# Patient Record
Sex: FEMALE | Race: BLACK OR AFRICAN AMERICAN | NOT HISPANIC OR LATINO | Employment: STUDENT | URBAN - METROPOLITAN AREA
[De-identification: names, ages, dates, MRNs, and addresses within clinical notes are randomized per-mention and may not be internally consistent; named-entity substitution may affect disease eponyms.]

---
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
## 2023-01-10 ENCOUNTER — OFFICE VISIT (OUTPATIENT)
Dept: FAMILY MEDICINE | Facility: CLINIC | Age: 18
End: 2023-01-10
Payer: COMMERCIAL

## 2023-01-10 VITALS
OXYGEN SATURATION: 98 % | HEART RATE: 77 BPM | TEMPERATURE: 99 F | BODY MASS INDEX: 30.31 KG/M2 | HEIGHT: 64 IN | DIASTOLIC BLOOD PRESSURE: 76 MMHG | SYSTOLIC BLOOD PRESSURE: 114 MMHG | WEIGHT: 177.56 LBS

## 2023-01-10 DIAGNOSIS — Z00.00 ANNUAL PHYSICAL EXAM: Primary | ICD-10-CM

## 2023-01-10 DIAGNOSIS — Z23 NEEDS FLU SHOT: ICD-10-CM

## 2023-01-10 DIAGNOSIS — G43.709 CHRONIC MIGRAINE WITHOUT AURA WITHOUT STATUS MIGRAINOSUS, NOT INTRACTABLE: ICD-10-CM

## 2023-01-10 PROCEDURE — 90471 FLU VACCINE (QUAD) GREATER THAN OR EQUAL TO 3YO PRESERVATIVE FREE IM: ICD-10-PCS | Mod: S$GLB,,, | Performed by: FAMILY MEDICINE

## 2023-01-10 PROCEDURE — 99999 PR PBB SHADOW E&M-EST. PATIENT-LVL IV: ICD-10-PCS | Mod: PBBFAC,,, | Performed by: FAMILY MEDICINE

## 2023-01-10 PROCEDURE — 99999 PR PBB SHADOW E&M-EST. PATIENT-LVL IV: CPT | Mod: PBBFAC,,, | Performed by: FAMILY MEDICINE

## 2023-01-10 PROCEDURE — 90471 IMMUNIZATION ADMIN: CPT | Mod: S$GLB,,, | Performed by: FAMILY MEDICINE

## 2023-01-10 PROCEDURE — 99395 PREV VISIT EST AGE 18-39: CPT | Mod: 25,S$GLB,, | Performed by: FAMILY MEDICINE

## 2023-01-10 PROCEDURE — 90686 FLU VACCINE (QUAD) GREATER THAN OR EQUAL TO 3YO PRESERVATIVE FREE IM: ICD-10-PCS | Mod: S$GLB,,, | Performed by: FAMILY MEDICINE

## 2023-01-10 PROCEDURE — 90686 IIV4 VACC NO PRSV 0.5 ML IM: CPT | Mod: S$GLB,,, | Performed by: FAMILY MEDICINE

## 2023-01-10 PROCEDURE — 99395 PR PREVENTIVE VISIT,EST,18-39: ICD-10-PCS | Mod: 25,S$GLB,, | Performed by: FAMILY MEDICINE

## 2023-01-10 RX ORDER — NORGESTIMATE AND ETHINYL ESTRADIOL 7DAYSX3 28
1 KIT ORAL
COMMUNITY
Start: 2022-09-02 | End: 2023-01-10

## 2023-01-10 RX ORDER — ISOTRETINOIN 40 MG/1
40 CAPSULE, LIQUID FILLED ORAL
COMMUNITY
Start: 2022-09-01 | End: 2023-01-10

## 2023-01-10 RX ORDER — PROPRANOLOL HYDROCHLORIDE 20 MG/1
20 TABLET ORAL DAILY
Qty: 30 TABLET | Refills: 11 | Status: SHIPPED | OUTPATIENT
Start: 2023-01-10 | End: 2023-09-15

## 2023-01-10 RX ORDER — RIZATRIPTAN BENZOATE 10 MG/1
5-10 TABLET ORAL DAILY PRN
Qty: 18 TABLET | Refills: 5 | Status: SHIPPED | OUTPATIENT
Start: 2023-01-10 | End: 2023-09-15

## 2023-01-10 NOTE — PROGRESS NOTES
Assessment & Plan  Annual physical exam  -     Lipid Panel; Future; Expected date: 01/10/2023  -     Hemoglobin A1C; Future; Expected date: 01/10/2023  -     Comprehensive Metabolic Panel; Future; Expected date: 01/10/2023  -     CBC Auto Differential; Future; Expected date: 01/10/2023    Routine fasting labs sent to [facility].    Chronic migraine without aura without status migrainosus, not intractable  -     propranoloL (INDERAL) 20 MG tablet; Take 1 tablet (20 mg total) by mouth once daily.  Dispense: 30 tablet; Refill: 11  -     rizatriptan (MAXALT) 10 MG tablet; Take 0.5-1 tablets (5-10 mg total) by mouth daily as needed for Migraine.  Dispense: 18 tablet; Refill: 5    Start propranolol for migraine prophylaxis. Maxalt 5-10 mg prn for abortive therapy. Advised to take at the onset of HA to be most effective. Informed of most common side effects. F/u in Mychart if no improvement after 1 month of treatment.    Needs flu shot  -     Influenza - Quadrivalent (PF)      Follow-up: Follow up if symptoms worsen or fail to improve.  ______________________________________________________________________    Chief Complaint  Chief Complaint   Patient presents with    Annual Exam       HPI  Stephany Magaña is a 17 y.o. female with medical diagnoses as listed in the medical history and problem list that presents to the office for her annual exam. She c/o headaches that occur 2-3 times a week without a known consistent trigger. Headaches feel like pressure around her head with photophobia. Sometimes they are worse when she wakes up. UTD on eye exam. Taking Motrin prn with limited relief. Drinks 4 bottles of water a day.    Health Maintenance         Date Due Completion Date    Chlamydia Screening Never done ---    COVID-19 Vaccine (4 - Booster for Pfizer series) 03/08/2022 1/11/2022    Influenza Vaccine (1) 09/01/2022 1/10/2022    TETANUS VACCINE 07/28/2024 7/28/2014              PAST MEDICAL HISTORY:  Past Medical History:    Diagnosis Date    Precocious puberty        PAST SURGICAL HISTORY:  Past Surgical History:   Procedure Laterality Date    KNEE SURGERY Left 11/2019    torn ACL       SOCIAL HISTORY:  Social History     Socioeconomic History    Marital status: Single   Tobacco Use    Smoking status: Never    Smokeless tobacco: Never   Substance and Sexual Activity    Alcohol use: Never    Drug use: Never    Sexual activity: Never       FAMILY HISTORY:  History reviewed. No pertinent family history.    ALLERGIES AND MEDICATIONS: updated and reviewed.  Review of patient's allergies indicates:   Allergen Reactions    No known drug allergies      Current Outpatient Medications   Medication Sig Dispense Refill    propranoloL (INDERAL) 20 MG tablet Take 1 tablet (20 mg total) by mouth once daily. 30 tablet 11    rizatriptan (MAXALT) 10 MG tablet Take 0.5-1 tablets (5-10 mg total) by mouth daily as needed for Migraine. 18 tablet 5     No current facility-administered medications for this visit.         ROS  Review of Systems   Constitutional:  Negative for activity change, fever and unexpected weight change.   HENT:  Negative for congestion and sore throat.    Eyes:  Positive for photophobia. Negative for visual disturbance.   Respiratory:  Negative for cough and shortness of breath.    Cardiovascular:  Negative for chest pain and leg swelling.   Gastrointestinal:  Negative for abdominal pain, constipation, diarrhea, nausea and vomiting.   Endocrine: Negative for polydipsia, polyphagia and polyuria.   Genitourinary:  Negative for dysuria and urgency.   Musculoskeletal:  Negative for arthralgias and gait problem.   Skin:  Negative for color change.   Neurological:  Positive for headaches. Negative for dizziness and weakness.   Psychiatric/Behavioral:  Negative for dysphoric mood and sleep disturbance. The patient is not nervous/anxious.          Physical Exam  Vitals:    01/10/23 0853   BP: 114/76   BP Location: Right arm   Patient  "Position: Sitting   BP Method: Medium (Manual)   Pulse: 77   Temp: 98.5 °F (36.9 °C)   TempSrc: Oral   SpO2: 98%   Weight: 80.6 kg (177 lb 9.3 oz)   Height: 5' 4" (1.626 m)    Body mass index is 30.48 kg/m².  Weight: 80.6 kg (177 lb 9.3 oz)   Height: 5' 4" (162.6 cm)   Physical Exam  Constitutional:       General: She is not in acute distress.  HENT:      Head: Normocephalic and atraumatic.      Right Ear: Tympanic membrane and ear canal normal.      Left Ear: Tympanic membrane and ear canal normal.      Nose: Nose normal.      Mouth/Throat:      Mouth: Mucous membranes are moist.      Pharynx: Oropharynx is clear.   Eyes:      Conjunctiva/sclera: Conjunctivae normal.      Pupils: Pupils are equal, round, and reactive to light.   Neck:      Thyroid: No thyromegaly.   Cardiovascular:      Rate and Rhythm: Normal rate and regular rhythm.      Pulses: Normal pulses.      Heart sounds: Normal heart sounds.   Pulmonary:      Effort: Pulmonary effort is normal. No respiratory distress.      Breath sounds: Normal breath sounds.   Musculoskeletal:      Cervical back: Neck supple.      Right lower leg: No edema.      Left lower leg: No edema.   Lymphadenopathy:      Cervical: No cervical adenopathy.   Skin:     General: Skin is warm and dry.      Findings: No rash.   Neurological:      General: No focal deficit present.      Mental Status: She is alert and oriented to person, place, and time.      Cranial Nerves: No cranial nerve deficit.   Psychiatric:         Mood and Affect: Mood normal.         Behavior: Behavior normal.         Thought Content: Thought content normal.           "

## 2023-01-11 LAB
ALBUMIN SERPL-MCNC: 4.5 G/DL (ref 3.6–5.1)
ALBUMIN/GLOB SERPL: 1.4 (CALC) (ref 1–2.5)
ALP SERPL-CCNC: 46 U/L (ref 36–128)
ALT SERPL-CCNC: 9 U/L (ref 5–32)
AST SERPL-CCNC: 13 U/L (ref 12–32)
BASOPHILS # BLD AUTO: 21 CELLS/UL (ref 0–200)
BASOPHILS NFR BLD AUTO: 0.4 %
BILIRUB SERPL-MCNC: 0.7 MG/DL (ref 0.2–1.1)
BUN SERPL-MCNC: 13 MG/DL (ref 7–20)
BUN/CREAT SERPL: NORMAL (CALC) (ref 6–22)
CALCIUM SERPL-MCNC: 9.8 MG/DL (ref 8.9–10.4)
CHLORIDE SERPL-SCNC: 102 MMOL/L (ref 98–110)
CHOLEST SERPL-MCNC: 170 MG/DL
CHOLEST/HDLC SERPL: 3.3 (CALC)
CO2 SERPL-SCNC: 28 MMOL/L (ref 20–32)
CREAT SERPL-MCNC: 0.74 MG/DL (ref 0.5–0.96)
EGFR: 119 ML/MIN/1.73M2
EOSINOPHIL # BLD AUTO: 31 CELLS/UL (ref 15–500)
EOSINOPHIL NFR BLD AUTO: 0.6 %
ERYTHROCYTE [DISTWIDTH] IN BLOOD BY AUTOMATED COUNT: 12 % (ref 11–15)
GLOBULIN SER CALC-MCNC: 3.3 G/DL (CALC) (ref 2–3.8)
GLUCOSE SERPL-MCNC: 85 MG/DL (ref 65–99)
HBA1C MFR BLD: 5 % OF TOTAL HGB
HCT VFR BLD AUTO: 38.3 % (ref 35–45)
HDLC SERPL-MCNC: 51 MG/DL
HGB BLD-MCNC: 12.1 G/DL (ref 11.7–15.5)
LDLC SERPL CALC-MCNC: 101 MG/DL (CALC)
LYMPHOCYTES # BLD AUTO: 1867 CELLS/UL (ref 850–3900)
LYMPHOCYTES NFR BLD AUTO: 35.9 %
MCH RBC QN AUTO: 27.2 PG (ref 27–33)
MCHC RBC AUTO-ENTMCNC: 31.6 G/DL (ref 32–36)
MCV RBC AUTO: 86.1 FL (ref 80–100)
MONOCYTES # BLD AUTO: 296 CELLS/UL (ref 200–950)
MONOCYTES NFR BLD AUTO: 5.7 %
NEUTROPHILS # BLD AUTO: 2985 CELLS/UL (ref 1500–7800)
NEUTROPHILS NFR BLD AUTO: 57.4 %
NONHDLC SERPL-MCNC: 119 MG/DL (CALC)
PLATELET # BLD AUTO: 471 THOUSAND/UL (ref 140–400)
PMV BLD REES-ECKER: 9.1 FL (ref 7.5–12.5)
POTASSIUM SERPL-SCNC: 4.6 MMOL/L (ref 3.8–5.1)
PROT SERPL-MCNC: 7.8 G/DL (ref 6.3–8.2)
RBC # BLD AUTO: 4.45 MILLION/UL (ref 3.8–5.1)
SODIUM SERPL-SCNC: 136 MMOL/L (ref 135–146)
TRIGL SERPL-MCNC: 86 MG/DL
WBC # BLD AUTO: 5.2 THOUSAND/UL (ref 3.8–10.8)

## 2023-01-12 ENCOUNTER — TELEPHONE (OUTPATIENT)
Dept: FAMILY MEDICINE | Facility: CLINIC | Age: 18
End: 2023-01-12
Payer: COMMERCIAL

## 2023-01-12 ENCOUNTER — PATIENT MESSAGE (OUTPATIENT)
Dept: FAMILY MEDICINE | Facility: CLINIC | Age: 18
End: 2023-01-12
Payer: COMMERCIAL

## 2023-01-12 DIAGNOSIS — D75.839 THROMBOCYTOSIS: Primary | ICD-10-CM

## 2023-01-12 NOTE — TELEPHONE ENCOUNTER
Called patient to schedule pathologist interpretation differential blood work order per PCP request. Reviewed patient message thread attached and saw patient was back in [place] for school. Scheduled patient at an [facility] lab in [place] to complete blood work for tomorrow afternoon.

## 2023-01-13 ENCOUNTER — LAB VISIT (OUTPATIENT)
Dept: LAB | Facility: HOSPITAL | Age: 18
End: 2023-01-13
Payer: COMMERCIAL

## 2023-01-13 DIAGNOSIS — D75.839 THROMBOCYTOSIS: ICD-10-CM

## 2023-01-13 LAB
BASOPHILS # BLD AUTO: 19 CELLS/UL (ref 0–200)
BASOPHILS NFR BLD AUTO: 0.4 %
EOSINOPHIL # BLD AUTO: 58 CELLS/UL (ref 15–500)
EOSINOPHIL NFR BLD AUTO: 1.2 %
ERYTHROCYTE [DISTWIDTH] IN BLOOD BY AUTOMATED COUNT: 11.9 % (ref 11–15)
HCT VFR BLD AUTO: 36.7 % (ref 35–45)
HGB BLD-MCNC: 12.1 G/DL (ref 11.7–15.5)
LYMPHOCYTES # BLD AUTO: 1622 CELLS/UL (ref 850–3900)
LYMPHOCYTES NFR BLD AUTO: 33.8 %
MCH RBC QN AUTO: 28 PG (ref 27–33)
MCHC RBC AUTO-ENTMCNC: 33 G/DL (ref 32–36)
MCV RBC AUTO: 85 FL (ref 80–100)
MONOCYTES # BLD AUTO: 370 CELLS/UL (ref 200–950)
MONOCYTES NFR BLD AUTO: 7.7 %
NEUTROPHILS # BLD AUTO: 2731 CELLS/UL (ref 1500–7800)
NEUTROPHILS NFR BLD AUTO: 56.9 %
PATH REV BLD -IMP: NORMAL
PLATELET # BLD AUTO: 489 THOUSAND/UL (ref 140–400)
PMV BLD REES-ECKER: 9.6 FL (ref 7.5–12.5)
RBC # BLD AUTO: 4.32 MILLION/UL (ref 3.8–5.1)
WBC # BLD AUTO: 4.8 THOUSAND/UL (ref 3.8–10.8)

## 2023-01-13 PROCEDURE — 85060 BLOOD SMEAR INTERPRETATION: CPT | Mod: ,,, | Performed by: PATHOLOGY

## 2023-01-13 PROCEDURE — 36415 COLL VENOUS BLD VENIPUNCTURE: CPT | Mod: PO | Performed by: FAMILY MEDICINE

## 2023-01-13 PROCEDURE — 85025 COMPLETE CBC W/AUTO DIFF WBC: CPT | Performed by: FAMILY MEDICINE

## 2023-01-13 PROCEDURE — 85060 PATHOLOGIST REVIEW: ICD-10-PCS | Mod: ,,, | Performed by: PATHOLOGY

## 2023-01-14 LAB
BASOPHILS # BLD AUTO: 0.03 K/UL (ref 0–0.2)
BASOPHILS NFR BLD: 0.4 % (ref 0–1.9)
DIFFERENTIAL METHOD: ABNORMAL
EOSINOPHIL # BLD AUTO: 0.1 K/UL (ref 0–0.5)
EOSINOPHIL NFR BLD: 1 % (ref 0–8)
ERYTHROCYTE [DISTWIDTH] IN BLOOD BY AUTOMATED COUNT: 12.4 % (ref 11.5–14.5)
HCT VFR BLD AUTO: 39.7 % (ref 37–48.5)
HGB BLD-MCNC: 12.2 G/DL (ref 12–16)
IMM GRANULOCYTES # BLD AUTO: 0.02 K/UL (ref 0–0.04)
IMM GRANULOCYTES NFR BLD AUTO: 0.3 % (ref 0–0.5)
LYMPHOCYTES # BLD AUTO: 2.1 K/UL (ref 1–4.8)
LYMPHOCYTES NFR BLD: 28.6 % (ref 18–48)
MCH RBC QN AUTO: 27.3 PG (ref 27–31)
MCHC RBC AUTO-ENTMCNC: 30.7 G/DL (ref 32–36)
MCV RBC AUTO: 89 FL (ref 82–98)
MONOCYTES # BLD AUTO: 0.5 K/UL (ref 0.3–1)
MONOCYTES NFR BLD: 6.4 % (ref 4–15)
NEUTROPHILS # BLD AUTO: 4.7 K/UL (ref 1.8–7.7)
NEUTROPHILS NFR BLD: 63.3 % (ref 38–73)
NRBC BLD-RTO: 0 /100 WBC
PLATELET # BLD AUTO: 512 K/UL (ref 150–450)
PMV BLD AUTO: 9.7 FL (ref 9.2–12.9)
RBC # BLD AUTO: 4.47 M/UL (ref 4–5.4)
WBC # BLD AUTO: 7.35 K/UL (ref 3.9–12.7)

## 2023-01-17 ENCOUNTER — PATIENT MESSAGE (OUTPATIENT)
Dept: FAMILY MEDICINE | Facility: CLINIC | Age: 18
End: 2023-01-17

## 2023-01-17 ENCOUNTER — PATIENT MESSAGE (OUTPATIENT)
Dept: FAMILY MEDICINE | Facility: CLINIC | Age: 18
End: 2023-01-17
Payer: COMMERCIAL

## 2023-01-17 ENCOUNTER — TELEPHONE (OUTPATIENT)
Dept: FAMILY MEDICINE | Facility: CLINIC | Age: 18
End: 2023-01-17

## 2023-01-17 DIAGNOSIS — D75.839 THROMBOCYTOSIS: Primary | ICD-10-CM

## 2023-01-17 LAB — PATH REV BLD -IMP: NORMAL

## 2023-01-17 PROCEDURE — 99452 NTRPROF PH1/NTRNET/EHR RFRL: CPT | Mod: S$GLB,,, | Performed by: FAMILY MEDICINE

## 2023-01-17 PROCEDURE — 99452 PR INTERPROF, PHONE/INTERNET/EHR, 30 MIN: ICD-10-PCS | Mod: S$GLB,,, | Performed by: FAMILY MEDICINE

## 2023-01-17 NOTE — TELEPHONE ENCOUNTER
[facility]  Request for E-Consult     Patient Name: Stephany Magaña  MRN: [id number]  Primary Care Provider: Brittany Abarca DO     Reason for Request: thrombocytosis    Verbal consent has been obtained from patient, and patient is aware of applicable cost: Yes    Total time spent preparing for the referral and/or communicating with the consulting physician: Time; 16 MIN - 1 HOUR: 16 minutes    Thank you for your time. I will follow-up with patient. Please contact us if you have any additional questions.    Brittany Abarca DO  [facility]

## 2023-01-17 NOTE — TELEPHONE ENCOUNTER
----- Message from Lorena Sorto sent at 1/17/2023  1:52 PM CST -----  Type: Patient Call Back        Who called: Self         What is the request in detail: Pt states she would like for a nurse to give her a call about her blood work results ...         Can the clinic reply by [facility]? No         Would the patient rather a call back or a response via [facility]? Yes         Best call back number: [phone] (home)               Thank You

## 2023-01-18 ENCOUNTER — E-CONSULT (OUTPATIENT)
Dept: HEMATOLOGY/ONCOLOGY | Facility: CLINIC | Age: 18
End: 2023-01-18
Payer: COMMERCIAL

## 2023-01-18 DIAGNOSIS — D75.839 THROMBOCYTOSIS: Primary | ICD-10-CM

## 2023-01-18 NOTE — PROGRESS NOTES
[facility]  [facility]  Response for E-Consult     Patient Name: Stephany Magaña  MRN: [id number]  Primary Care Provider: Brittany Abarca DO   Requesting Provider: Brittany Abarca,       Findings:  The most common cause of thrombocytosis is iron deficiency she could be iron deficient with out having anemia.  Her hemoglobin is at the low end of normal couple of options easiest way to do this is to get her iron studies serum iron TIBC and ferritin if that is not possible you could try therapeutic trial of oral iron or supplement iron rich foods as an excellent article in up-to-date on this and see whether not she response.  I would recommend the iron documentation.  Most likely a 17-year-old female who is menstruating    I did not speak to the requesting provider verbally about this.     Total time of Consultation: 15 minute    Percentage of time spent on verbal/written discussion: 50%     *This eConsult is based on the clinical data available to me and is furnished without benefit of a physical examination. The eConsult will need to be interpreted in light of any clinical issues or changes in patient status not available to me at the time of filing this eConsults. Significant changes in patient condition or level of acuity should result in immediate formal consultation and reevaluation. Please alert me if you have further questions.    Thank you for your consult.     Chris Gaytan MD  [facility]  [facility]

## 2023-01-19 ENCOUNTER — TELEPHONE (OUTPATIENT)
Dept: FAMILY MEDICINE | Facility: CLINIC | Age: 18
End: 2023-01-19
Payer: COMMERCIAL

## 2023-01-19 DIAGNOSIS — D75.839 THROMBOCYTOSIS: Primary | ICD-10-CM

## 2023-01-19 NOTE — TELEPHONE ENCOUNTER
----- Message from Ledy Hall sent at 1/19/2023  9:20 AM CST -----  Regarding: Patient call back  .Type: Patient Call Back    Who called: Self    What is the request in detail: Patient would like lab orders sent over to [facility] in [place]    Can the clinic reply by [facility]? No    Would the patient rather a call back or a response via [facility]? Call     Best call back number: .[phone]      Additional Information: [address] [place] [phone]

## 2023-01-23 ENCOUNTER — PATIENT MESSAGE (OUTPATIENT)
Dept: FAMILY MEDICINE | Facility: CLINIC | Age: 18
End: 2023-01-23
Payer: COMMERCIAL

## 2023-01-23 DIAGNOSIS — D75.839 THROMBOCYTOSIS: Primary | ICD-10-CM

## 2023-01-23 LAB
CRP SERPL-MCNC: 2.4 MG/L
ERYTHROCYTE [SEDIMENTATION RATE] IN BLOOD BY WESTERGREN METHOD: 9 MM/H
FERRITIN SERPL-MCNC: 68 NG/ML (ref 16–154)
IRON SATN MFR SERPL: 41 % (CALC) (ref 15–45)
IRON SERPL-MCNC: 107 MCG/DL (ref 27–164)
TIBC SERPL-MCNC: 261 MCG/DL (CALC) (ref 271–448)

## 2023-02-22 ENCOUNTER — TELEPHONE (OUTPATIENT)
Dept: HEMATOLOGY/ONCOLOGY | Facility: CLINIC | Age: 18
End: 2023-02-22
Payer: COMMERCIAL

## 2023-02-22 NOTE — TELEPHONE ENCOUNTER
To Kamryn anna MA   The patient that needs the lab orders is [id number] natalie Magaña  She needs the following labs at [facility]    CBC w/diff   pathologist review      iron   ferritin     TIBC      soluble transferrin       retic count      sedimentation rate       Folate     MMA -Methylmalonic  acid,serum         Jatin 2                  CALR mutation analysis  Merlin reeves

## 2023-02-24 ENCOUNTER — OFFICE VISIT (OUTPATIENT)
Dept: HEMATOLOGY/ONCOLOGY | Facility: CLINIC | Age: 18
End: 2023-02-24
Payer: COMMERCIAL

## 2023-02-24 VITALS
WEIGHT: 179.25 LBS | DIASTOLIC BLOOD PRESSURE: 68 MMHG | OXYGEN SATURATION: 100 % | HEART RATE: 77 BPM | HEIGHT: 64 IN | BODY MASS INDEX: 30.6 KG/M2 | SYSTOLIC BLOOD PRESSURE: 113 MMHG

## 2023-02-24 DIAGNOSIS — Z76.0 MEDICATION REFILL: ICD-10-CM

## 2023-02-24 DIAGNOSIS — D75.839 THROMBOCYTOSIS: Primary | ICD-10-CM

## 2023-02-24 DIAGNOSIS — Z76.89 ENCOUNTER TO ESTABLISH CARE: ICD-10-CM

## 2023-02-24 PROCEDURE — 99204 PR OFFICE/OUTPT VISIT, NEW, LEVL IV, 45-59 MIN: ICD-10-PCS | Mod: S$GLB,,, | Performed by: STUDENT IN AN ORGANIZED HEALTH CARE EDUCATION/TRAINING PROGRAM

## 2023-02-24 PROCEDURE — 99999 PR PBB SHADOW E&M-EST. PATIENT-LVL III: ICD-10-PCS | Mod: PBBFAC,,, | Performed by: STUDENT IN AN ORGANIZED HEALTH CARE EDUCATION/TRAINING PROGRAM

## 2023-02-24 PROCEDURE — 99204 OFFICE O/P NEW MOD 45 MIN: CPT | Mod: S$GLB,,, | Performed by: STUDENT IN AN ORGANIZED HEALTH CARE EDUCATION/TRAINING PROGRAM

## 2023-02-24 PROCEDURE — 99999 PR PBB SHADOW E&M-EST. PATIENT-LVL III: CPT | Mod: PBBFAC,,, | Performed by: STUDENT IN AN ORGANIZED HEALTH CARE EDUCATION/TRAINING PROGRAM

## 2023-02-24 RX ORDER — FERROUS SULFATE 325(65) MG
325 TABLET, DELAYED RELEASE (ENTERIC COATED) ORAL EVERY OTHER DAY
Qty: 45 TABLET | Refills: 0 | Status: SHIPPED | OUTPATIENT
Start: 2023-02-24 | End: 2023-05-25

## 2023-02-24 NOTE — PROGRESS NOTES
Hematology- Oncology Clinic Note :     2023    No chief complaint on file.        TARI Magaña is a  17 y.o. female with medical diagnoses as listed below in the medical history who presents for thromobocytosis.  Pt presents with mother and feels well and denied any issues at time of exam.  Of note pt completed 6 months of accutane for acne.  Pt has regular periods that are not heavy per pt.  Ferritin low end of normal so trial of PO iron started.  Plts appears to have been fluctuating and cause is most likely multifactorial and we can recheck in 3 months after PO iron trial started.      Active Problem List with Overview Notes    Diagnosis Date Noted    Thrombocytosis 2023    Precocious puberty 2012       Patient Active Problem List    Diagnosis Date Noted    Thrombocytosis 2023    Precocious puberty 2012     Past Medical History:   Diagnosis Date    Precocious puberty       Past Surgical History:   Procedure Laterality Date    KNEE SURGERY Left 2019    torn ACL      (Not in a hospital admission)    Review of patient's allergies indicates:   Allergen Reactions    No known drug allergies       Social History     Tobacco Use    Smoking status: Never    Smokeless tobacco: Never   Substance Use Topics    Alcohol use: Never      No family history on file.     Review of Systems :  Review of Systems   Constitutional:  Negative for fever, malaise/fatigue and weight loss.   HENT:  Negative for congestion and hearing loss.    Eyes:  Negative for blurred vision and photophobia.   Respiratory:  Negative for cough and shortness of breath.    Cardiovascular:  Negative for chest pain.   Gastrointestinal:  Negative for abdominal pain, blood in stool, heartburn, melena, nausea and vomiting.   Genitourinary:  Negative for dysuria and hematuria.   Musculoskeletal:  Negative for joint pain and myalgias.   Skin:  Negative for itching and rash.   Neurological:  Negative for dizziness and  weakness.   Endo/Heme/Allergies:  Does not bruise/bleed easily.   Psychiatric/Behavioral:  Negative for depression.      Physical Exam :  Wt Readings from Last 3 Encounters:   23 81.3 kg (179 lb 3.7 oz) (94 %, Z= 1.59)*   01/10/23 80.6 kg (177 lb 9.3 oz) (94 %, Z= 1.56)*   01/10/22 81.1 kg (178 lb 14.5 oz) (95 %, Z= 1.64)*     * Growth percentiles are based on CDC (Girls, 2-20 Years) data.     Temp Readings from Last 3 Encounters:   01/10/23 98.5 °F (36.9 °C) (Oral)   01/10/22 98.5 °F (36.9 °C) (Oral)   20 98.2 °F (36.8 °C) (Oral)     BP Readings from Last 3 Encounters:   23 113/68   01/10/23 114/76   01/10/22 96/70     Pulse Readings from Last 3 Encounters:   23 77   01/10/23 77   01/10/22 76     Body mass index is 30.77 kg/m².    Physical Exam  Vitals reviewed.   HENT:      Head: Normocephalic and atraumatic.      Mouth/Throat:      Mouth: Mucous membranes are moist.   Eyes:      Pupils: Pupils are equal, round, and reactive to light.   Cardiovascular:      Rate and Rhythm: Normal rate.      Heart sounds: Normal heart sounds.   Pulmonary:      Effort: Pulmonary effort is normal.      Breath sounds: Normal breath sounds.   Abdominal:      General: Abdomen is flat.   Musculoskeletal:         General: Normal range of motion.      Cervical back: Normal range of motion and neck supple.   Skin:     General: Skin is warm and dry.   Neurological:      Mental Status: She is alert and oriented to person, place, and time.   Psychiatric:         Mood and Affect: Mood normal.         Behavior: Behavior normal.         Thought Content: Thought content normal.         Judgment: Judgment normal.         Pertinent Diagnostic studies:    No results found for this or any previous visit (from the past 24 hour(s)).    Assessment/Plan :       Thrombocytosis  -, denies heavy periods or easy bruising or bleeding  -fluctuating for past year  -481 on recent labs at [facility]  -CBC review significant only for mild  thrombocytosis   -Abnormal plt count most likely multifactorial from recent Accutane use, periods  -Ferritin low end of normal so will try oral iron ever other day  -RTC in 3 months for MD and labs and repeat labs    Time spent with pt: 45 minutes       Summary of orders placed this encounter:  Orders Placed This Encounter   Procedures    Soluble Transferrin Receptor    MPN (JAK2 V617F)WITH REFLEX TO CALR,MPL    JAK2 Exon 12 And Other Non-V617 Mutation Detection, Bld    BCR/ABL, RNA-QUAL, DIAGNOSTIC w/REFLEX, BLOOD    METHYLMALONIC ACID, SERUM    FOLATE    COPPER, SERUM    IRON AND TIBC    Ferritin    Soluble Transferrin Receptor    CBC w/ DIFF       Future Appointments   Date Time Provider Department Center   5/15/2023 10:00 AM LAB, [facility] LAB [facility] Hos   5/24/2023 11:00 AM Raul Melara MD [facility] HEM ONC [facility] Cli           Raul Melara MD   Hematology/oncology, [facility]

## 2023-03-21 ENCOUNTER — PATIENT MESSAGE (OUTPATIENT)
Dept: PEDIATRICS | Facility: CLINIC | Age: 18
End: 2023-03-21

## 2023-06-07 ENCOUNTER — PATIENT MESSAGE (OUTPATIENT)
Dept: FAMILY MEDICINE | Facility: CLINIC | Age: 18
End: 2023-06-07
Payer: COMMERCIAL

## 2023-09-15 ENCOUNTER — OFFICE VISIT (OUTPATIENT)
Dept: FAMILY MEDICINE | Facility: CLINIC | Age: 18
End: 2023-09-15
Payer: COMMERCIAL

## 2023-09-15 VITALS
OXYGEN SATURATION: 98 % | HEART RATE: 67 BPM | RESPIRATION RATE: 14 BRPM | HEIGHT: 64 IN | TEMPERATURE: 98 F | DIASTOLIC BLOOD PRESSURE: 64 MMHG | WEIGHT: 173 LBS | SYSTOLIC BLOOD PRESSURE: 115 MMHG | BODY MASS INDEX: 29.53 KG/M2

## 2023-09-15 DIAGNOSIS — R41.840 DIFFICULTY CONCENTRATING: ICD-10-CM

## 2023-09-15 DIAGNOSIS — Z11.3 SCREENING FOR STD (SEXUALLY TRANSMITTED DISEASE): Primary | ICD-10-CM

## 2023-09-15 DIAGNOSIS — G43.709 CHRONIC MIGRAINE WITHOUT AURA WITHOUT STATUS MIGRAINOSUS, NOT INTRACTABLE: ICD-10-CM

## 2023-09-15 DIAGNOSIS — Z00.00 ANNUAL PHYSICAL EXAM: ICD-10-CM

## 2023-09-15 DIAGNOSIS — D75.839 THROMBOCYTOSIS: ICD-10-CM

## 2023-09-15 DIAGNOSIS — Z02.82 ADOPTED PERSON: ICD-10-CM

## 2023-09-15 PROCEDURE — 99999 PR PBB SHADOW E&M-EST. PATIENT-LVL IV: CPT | Mod: PBBFAC,,, | Performed by: STUDENT IN AN ORGANIZED HEALTH CARE EDUCATION/TRAINING PROGRAM

## 2023-09-15 PROCEDURE — 99395 PREV VISIT EST AGE 18-39: CPT | Mod: S$GLB,,, | Performed by: STUDENT IN AN ORGANIZED HEALTH CARE EDUCATION/TRAINING PROGRAM

## 2023-09-15 PROCEDURE — 99999 PR PBB SHADOW E&M-EST. PATIENT-LVL IV: ICD-10-PCS | Mod: PBBFAC,,, | Performed by: STUDENT IN AN ORGANIZED HEALTH CARE EDUCATION/TRAINING PROGRAM

## 2023-09-15 PROCEDURE — 87591 N.GONORRHOEAE DNA AMP PROB: CPT | Performed by: STUDENT IN AN ORGANIZED HEALTH CARE EDUCATION/TRAINING PROGRAM

## 2023-09-15 PROCEDURE — 99395 PR PREVENTIVE VISIT,EST,18-39: ICD-10-PCS | Mod: S$GLB,,, | Performed by: STUDENT IN AN ORGANIZED HEALTH CARE EDUCATION/TRAINING PROGRAM

## 2023-09-15 RX ORDER — PROPRANOLOL HYDROCHLORIDE 20 MG/1
20 TABLET ORAL 2 TIMES DAILY
Qty: 180 TABLET | Refills: 3 | Status: SHIPPED | OUTPATIENT
Start: 2023-09-15 | End: 2023-11-10 | Stop reason: SDUPTHER

## 2023-09-15 NOTE — PROGRESS NOTES
Problem List Items Addressed This Visit          Neuro    Chronic migraine without aura without status migrainosus, not intractable    Overview     Chronic hx  - avoid triggers and use OTC meds sparingly, increase fluid intake, ensure up to date eye exam   - increase to propranolol 20mg BID and prn otc tylenol/excedrine         Relevant Medications    propranoloL (INDERAL) 20 MG tablet       Oncology    Thrombocytosis    Overview     Lab Results   Component Value Date    WBC 7.35 01/13/2023    HGB 12.2 01/13/2023    HCT 39.7 01/13/2023    MCV 89 01/13/2023     (H) 01/13/2023   - repeat labs     path review 2023  PATHOLOGIST INTERPRETATION   RBC- No significant morphologic abnormalities, normal indices   WBC- Normal morphology and differential.  No dysplasia or circulating   blasts.   PLT- Increased in number. No clumping.            Relevant Orders    TSH    Lipid Panel    Hemoglobin A1C    Comprehensive Metabolic Panel    CBC Auto Differential    Sedimentation rate    C-REACTIVE PROTEIN       Other    Annual physical exam    Overview     Complete history and physical was completed today.    Complete and thorough medication reconciliation was performed. Discussed risks and benefits of medications.    Advised patient on orders and health maintenance.    Continue current medications listed on your summary sheet.    All questions were answered.   Follow up as planned or prn            Relevant Orders    TSH    Lipid Panel    Hemoglobin A1C    Comprehensive Metabolic Panel    CBC Auto Differential    Sedimentation rate    C-REACTIVE PROTEIN    Adopted person (Chronic)    Overview     Does not know much about fhx re: add, learning disabilities           Other Visit Diagnoses       Screening for STD (sexually transmitted disease)    -  Primary    Relevant Orders    C. trachomatis/N. gonorrhoeae by AMP DNA    Difficulty concentrating        Relevant Orders    Ambulatory referral/consult to Psychology               Patient ID: Stephany Magaña is a 18 y.o. female.    Chief Complaint:  establish care    Patient is here to establish care. Has a hx of  has a past medical history of Precocious puberty.   Currently a marco at [employer], studying kinesiology.     Works at campus security. Feels safe on campus.     Reports migraines started in HS. Each episode occurs 1-2X weekly. Improving with propranolol 20 daily; however, still withj 4-8 migraines monthly.   Last eye exam: 2023    Drinks adequate water.       History:  OBGYN: n/a      LMP: No LMP recorded.   MMG: no fhx breast cancer    PAP: n/a  Colonoscopy: No personal history of colon cancer, hematochezia, melena, crohn's, ulcerative colitis; No family history of colon cancer.      Health Maintenance Topics with due status: Not Due       Topic Last Completion Date    TETANUS VACCINE 07/28/2014        ==============================================  History reviewed.   Health Maintenance Due   Topic Date Due    Chlamydia Screening  Never done    COVID-19 Vaccine (4 - Pfizer risk series) 03/08/2022    Influenza Vaccine (1) 09/01/2023       Past Medical History:  Past Medical History:   Diagnosis Date    Precocious puberty      Past Surgical History:   Procedure Laterality Date    KNEE SURGERY Left 11/2019    torn ACL     Review of patient's allergies indicates:   Allergen Reactions    No known drug allergies      Current Outpatient Medications on File Prior to Visit   Medication Sig Dispense Refill    [DISCONTINUED] propranoloL (INDERAL) 20 MG tablet Take 1 tablet (20 mg total) by mouth once daily. 30 tablet 11    [DISCONTINUED] rizatriptan (MAXALT) 10 MG tablet Take 0.5-1 tablets (5-10 mg total) by mouth daily as needed for Migraine. (Patient not taking: Reported on 9/15/2023) 18 tablet 5     No current facility-administered medications on file prior to visit.     Social History     Socioeconomic History    Marital status: Single   Tobacco Use    Smoking  status: Never    Smokeless tobacco: Never   Substance and Sexual Activity    Alcohol use: Never    Drug use: Never    Sexual activity: Never     History reviewed. No pertinent family history.       Review of Systems   12 point review of systems per hpi, otherwise negative         Objective:    Nursing note and vitals reviewed.  Vitals:    09/15/23 1358   BP: 115/64   Pulse: 67   Resp: 14   Temp: 98.3 °F (36.8 °C)     Body mass index is 29.7 kg/m².     Physical Exam   Constitutional: SHE is oriented to person, place, and time. She appears well-developed and well-nourished. No distress.   HENT: WNL  Head: Normocephalic and atraumatic.   Eyes: Pupils are equal, round, and reactive to light. EOM are normal.   Neck: Normal range of motion. Neck supple.   Cardiovascular: Normal rate, regular rhythm, normal heart sounds and intact distal pulses.   No murmur heard.  Pulmonary/Chest: Effort normal and breath sounds normal. No respiratory distress. She has no wheezes.   GI: soft, non distended, no ttp, no rebound/guarding  Musculoskeletal: Normal range of motion. She exhibits no edema.   Neurological: She is alert and oriented to person, place, and time. No cranial nerve deficit.   Skin: Skin is warm and dry. Capillary refill takes less than 2 seconds.   Psychiatric: She has a normal mood and affect. Her behavior is normal.           Kamryn Arce MD    We Offer Telehealth & Same Day Appointments!   Book your Telehealth appointment with me through my nurse or   Clinic appointments on [facility]!  [phone]     To Schedule appointments online, go to [facility]: [email or web address]

## 2023-09-17 LAB
C TRACH DNA SPEC QL NAA+PROBE: NOT DETECTED
N GONORRHOEA DNA SPEC QL NAA+PROBE: NOT DETECTED

## 2023-09-29 ENCOUNTER — TELEPHONE (OUTPATIENT)
Dept: FAMILY MEDICINE | Facility: CLINIC | Age: 18
End: 2023-09-29
Payer: COMMERCIAL

## 2023-09-29 NOTE — TELEPHONE ENCOUNTER
----- Message from Soo Ahn sent at 9/29/2023  2:16 PM CDT -----  Contact: COLBY GUZMAN [[id number]]  ..Type:  Patient Requesting Call    Who Called: Chayito, Mother   Does the patient know what this is regarding?: Fax orders to [facility] for blood work  Would the patient rather a call back or a response via [facility]?  call  Best Call Back Number: .[phone]  Additional Information: Fax [phone]    Pt currently at [facility].

## 2023-09-29 NOTE — TELEPHONE ENCOUNTER
Orders changed to [facility]. I have attempted to contact this patient by phone with the following results: no answer, left message to return my call on answering machine.

## 2023-10-02 LAB
ALBUMIN SERPL-MCNC: 4.2 G/DL (ref 3.6–5.1)
ALBUMIN/GLOB SERPL: 1.2 (CALC) (ref 1–2.5)
ALP SERPL-CCNC: 45 U/L (ref 31–125)
ALT SERPL-CCNC: 7 U/L (ref 6–29)
AST SERPL-CCNC: 11 U/L (ref 10–30)
BASOPHILS # BLD AUTO: 28 CELLS/UL (ref 0–200)
BASOPHILS NFR BLD AUTO: 0.4 %
BILIRUB SERPL-MCNC: 0.9 MG/DL (ref 0.2–1.2)
BUN SERPL-MCNC: 12 MG/DL (ref 7–25)
BUN/CREAT SERPL: ABNORMAL (CALC) (ref 6–22)
CALCIUM SERPL-MCNC: 9.8 MG/DL (ref 8.6–10.2)
CHLORIDE SERPL-SCNC: 101 MMOL/L (ref 98–110)
CHOLEST SERPL-MCNC: 159 MG/DL
CHOLEST/HDLC SERPL: 3 (CALC)
CO2 SERPL-SCNC: 25 MMOL/L (ref 20–32)
CREAT SERPL-MCNC: 0.76 MG/DL (ref 0.5–0.96)
CRP SERPL-MCNC: 2.9 MG/L
EGFR: 115 ML/MIN/1.73M2
EOSINOPHIL # BLD AUTO: 71 CELLS/UL (ref 15–500)
EOSINOPHIL NFR BLD AUTO: 1 %
ERYTHROCYTE [DISTWIDTH] IN BLOOD BY AUTOMATED COUNT: 11.7 % (ref 11–15)
ERYTHROCYTE [SEDIMENTATION RATE] IN BLOOD BY WESTERGREN METHOD: 2 MM/H
GLOBULIN SER CALC-MCNC: 3.4 G/DL (CALC) (ref 1.9–3.7)
GLUCOSE SERPL-MCNC: 81 MG/DL (ref 65–99)
HBA1C MFR BLD: 5 % OF TOTAL HGB
HCT VFR BLD AUTO: 38.4 % (ref 35–45)
HDLC SERPL-MCNC: 53 MG/DL
HGB BLD-MCNC: 12.1 G/DL (ref 11.7–15.5)
LDLC SERPL CALC-MCNC: 93 MG/DL (CALC)
LYMPHOCYTES # BLD AUTO: 2407 CELLS/UL (ref 850–3900)
LYMPHOCYTES NFR BLD AUTO: 33.9 %
MCH RBC QN AUTO: 27.3 PG (ref 27–33)
MCHC RBC AUTO-ENTMCNC: 31.5 G/DL (ref 32–36)
MCV RBC AUTO: 86.5 FL (ref 80–100)
MONOCYTES # BLD AUTO: 419 CELLS/UL (ref 200–950)
MONOCYTES NFR BLD AUTO: 5.9 %
NEUTROPHILS # BLD AUTO: 4175 CELLS/UL (ref 1500–7800)
NEUTROPHILS NFR BLD AUTO: 58.8 %
NONHDLC SERPL-MCNC: 106 MG/DL (CALC)
PLATELET # BLD AUTO: 459 THOUSAND/UL (ref 140–400)
PMV BLD REES-ECKER: 9.3 FL (ref 7.5–12.5)
POTASSIUM SERPL-SCNC: 4.8 MMOL/L (ref 3.5–5.3)
PROT SERPL-MCNC: 7.6 G/DL (ref 6.1–8.1)
RBC # BLD AUTO: 4.44 MILLION/UL (ref 3.8–5.1)
SODIUM SERPL-SCNC: 134 MMOL/L (ref 135–146)
TRIGL SERPL-MCNC: 52 MG/DL
TSH SERPL-ACNC: 1.37 MIU/L
WBC # BLD AUTO: 7.1 THOUSAND/UL (ref 3.8–10.8)

## 2023-10-03 NOTE — PROGRESS NOTES
6m cbc          Dear Ms.Stephany Magaña       I have reviewed your recent blood work:       - Your complete blood count is normal, slightly elevated platelets, but improving. Repeat in 6m    - Your metabolic panel which shows your electrolytes, glucose, kidney function, and liver function is normal      - A1c is normal (labs to screen for prediabetes and diabetes)     - Thyroid function is normal     Your cholesterol is normal     Please do not hesitate to call or message with any additional questions or concerns.  Kamryn Arce MD

## 2023-11-10 DIAGNOSIS — G43.709 CHRONIC MIGRAINE WITHOUT AURA WITHOUT STATUS MIGRAINOSUS, NOT INTRACTABLE: ICD-10-CM

## 2023-11-10 NOTE — TELEPHONE ENCOUNTER
No care due was identified.  [facility] Embedded Care Due Messages. Reference number: [id number].   11/10/2023 3:13:47 PM CST

## 2023-11-11 RX ORDER — PROPRANOLOL HYDROCHLORIDE 20 MG/1
20 TABLET ORAL 2 TIMES DAILY
Qty: 180 TABLET | Refills: 3 | Status: SHIPPED | OUTPATIENT
Start: 2023-11-11

## 2023-11-11 NOTE — TELEPHONE ENCOUNTER
Refill Routing Note   Medication(s) are not appropriate for processing by [facility] for the following reason(s):      New or recently adjusted medication    [facility] action(s):  Defer Care Due:  None identified              Appointments  past 12m or future 3m with PCP    Date Provider   Last Visit   9/15/2023 Kamryn Arce MD   Next Visit   Visit date not found Kamryn Arce MD   ED visits in past 90 days: 0        Note composed:8:39 PM 11/10/2023

## 2023-12-18 PROBLEM — Z00.00 ANNUAL PHYSICAL EXAM: Status: RESOLVED | Noted: 2023-09-15 | Resolved: 2023-12-18

## 2024-01-31 ENCOUNTER — PATIENT MESSAGE (OUTPATIENT)
Dept: FAMILY MEDICINE | Facility: CLINIC | Age: 19
End: 2024-01-31
Payer: COMMERCIAL

## 2024-02-19 ENCOUNTER — OFFICE VISIT (OUTPATIENT)
Dept: FAMILY MEDICINE | Facility: CLINIC | Age: 19
End: 2024-02-19
Payer: COMMERCIAL

## 2024-02-19 ENCOUNTER — TELEPHONE (OUTPATIENT)
Dept: FAMILY MEDICINE | Facility: CLINIC | Age: 19
End: 2024-02-19

## 2024-02-19 DIAGNOSIS — G43.709 CHRONIC MIGRAINE WITHOUT AURA WITHOUT STATUS MIGRAINOSUS, NOT INTRACTABLE: Primary | ICD-10-CM

## 2024-02-19 DIAGNOSIS — L50.9 URTICARIA: ICD-10-CM

## 2024-02-19 PROCEDURE — 99214 OFFICE O/P EST MOD 30 MIN: CPT | Mod: 95,,, | Performed by: FAMILY MEDICINE

## 2024-02-19 RX ORDER — HYDROXYZINE HYDROCHLORIDE 10 MG/1
10 TABLET, FILM COATED ORAL 3 TIMES DAILY PRN
Qty: 90 TABLET | Refills: 1 | Status: SHIPPED | OUTPATIENT
Start: 2024-02-19

## 2024-02-19 NOTE — TELEPHONE ENCOUNTER
Spoke to patient in reference to her missed calls. Informed patient that her provider put in referrals for her & that it was probably their office trying to reach out to her for follow  up appointments.

## 2024-02-19 NOTE — TELEPHONE ENCOUNTER
----- Message from Erika Barrett sent at 2/19/2024  3:28 PM CST -----  Regarding: self  Type:  Patient Returning Call         Who Called: self          Who Left Message for Patient: dont know         Does the patient know what this is regarding?: f/u appt         Would the patient rather a call back or a response via [facility]? Call back          Best Call Back Number:[phone]

## 2024-02-19 NOTE — PROGRESS NOTES
Assessment & Plan:    Chronic migraine without aura without status migrainosus, not intractable   -     CT Head Without Contrast; Future; Expected date: 02/19/2024    CT head to be performed.   Continue prophylaxis with propranolol and OTC NSAIDs for abortive therapy.     Urticaria  -     hydrOXYzine HCL (ATARAX) 10 MG Tab; Take 1 tablet (10 mg total) by mouth 3 (three) times daily as needed (hives).  Dispense: 90 tablet; Refill: 1  -     Ambulatory referral/consult to Allergy; Future; Expected date: 02/26/2024    Hydroxyzine prn - may titrate dose up if needed.  Recommended referral to Allergy for allergy testing possibly causing her hives.       The patient location is:  Patient Home   The chief complaint leading to consultation is: noted below  Visit type: Virtual visit with synchronous audio and video  Total time spent with patient: 10 minutes  Each patient to whom he or she provides medical services by telemedicine is:  (1) informed of the relationship between the physician and patient and the respective role of any other health care provider with respect to management of the patient; and (2) notified that he or she may decline to receive medical services by telemedicine and may withdraw from such care at any time.    Follow-up: Follow up if symptoms worsen or fail to improve.    ______________________________________________________________________    Chief Complaint  Chief Complaint   Patient presents with    Migraine       HPI  Stephany Magaña is a 18 y.o. female with multiple medical diagnoses as listed in the medical history and problem list that presents in a virtual visit to discuss chronic migraines and a new concern of urticaria. Her mother was present on speaker phone. Patient was last seen by the provider for this concern in January of last year. Patient states that her headaches have woken her up at night and they have become sudden and different than they were before. She has been taking propranolol  "20 mg bid, which has been effective abortive therapy but is still getting HA about twice a week. Reports an incident last week in which she had a severe migraine associated with neck pain and ability to hear her own heart beating. CLEMENTS was eliminated with OTC ibuprofen. Patient and her mother would like to continue her therapy plan now but are interested in getting a CT head to evaluate for neurologic causes.     Patient also c/o outbreak of "hives" at random times all over her body. She went to the [facility] and they prescribed an antihistamine, which was effective. Denies anxiety.       PAST MEDICAL HISTORY:  Past Medical History:   Diagnosis Date    Precocious puberty        PAST SURGICAL HISTORY:  Past Surgical History:   Procedure Laterality Date    KNEE SURGERY Left 11/2019    torn ACL       SOCIAL HISTORY:  Social History     Socioeconomic History    Marital status: Single   Tobacco Use    Smoking status: Never    Smokeless tobacco: Never   Substance and Sexual Activity    Alcohol use: Never    Drug use: Never    Sexual activity: Never     Social Determinants of Health     Financial Resource Strain: Low Risk  (2/18/2024)    Overall Financial Resource Strain (CARDIA)     Difficulty of Paying Living Expenses: Not hard at all   Food Insecurity: No Food Insecurity (2/18/2024)    Hunger Vital Sign     Worried About Running Out of Food in the Last Year: Never true     Ran Out of Food in the Last Year: Never true   Transportation Needs: No Transportation Needs (2/18/2024)    PRAPARE - Transportation     Lack of Transportation (Medical): No     Lack of Transportation (Non-Medical): No   Physical Activity: Insufficiently Active (2/18/2024)    Exercise Vital Sign     Days of Exercise per Week: 4 days     Minutes of Exercise per Session: 20 min   Stress: No Stress Concern Present (2/18/2024)    [place] [employer] - Occupational Stress Questionnaire     Feeling of Stress : Only a little "   Social Connections: Unknown (2/18/2024)    Social Connection and Isolation Panel [NHANES]     Frequency of Communication with Friends and Family: More than three times a week     Frequency of Social Gatherings with Friends and Family: More than three times a week     Active Member of Clubs or Organizations: Yes     Attends Club or Organization Meetings: 1 to 4 times per year     Marital Status: Never    Housing Stability: Low Risk  (2/18/2024)    Housing Stability Vital Sign     Unable to Pay for Housing in the Last Year: No     Number of Places Lived in the Last Year: 2     Unstable Housing in the Last Year: No       FAMILY HISTORY:  History reviewed. No pertinent family history.    ALLERGIES AND MEDICATIONS: updated and reviewed.  Review of patient's allergies indicates:   Allergen Reactions    No known drug allergies      Current Outpatient Medications   Medication Sig Dispense Refill    hydrOXYzine HCL (ATARAX) 10 MG Tab Take 1 tablet (10 mg total) by mouth 3 (three) times daily as needed (hives). 90 tablet 1    propranoloL (INDERAL) 20 MG tablet Take 1 tablet (20 mg total) by mouth 2 (two) times daily. 180 tablet 3     No current facility-administered medications for this visit.         ROS  Review of Systems   Constitutional:  Negative for activity change and unexpected weight change.   HENT:  Negative for hearing loss, rhinorrhea and trouble swallowing.    Eyes:  Positive for photophobia. Negative for discharge and visual disturbance.   Respiratory:  Negative for chest tightness and wheezing.    Cardiovascular:  Negative for chest pain and palpitations.   Gastrointestinal:  Negative for blood in stool, constipation, diarrhea and vomiting.   Endocrine: Negative for polydipsia and polyuria.   Genitourinary:  Negative for difficulty urinating, dysuria, hematuria and menstrual problem.   Musculoskeletal:  Positive for neck pain. Negative for arthralgias and joint swelling.   Skin:  Positive for color  change and rash.   Neurological:  Positive for headaches. Negative for weakness.   Psychiatric/Behavioral:  Negative for confusion and dysphoric mood.            Physical Exam  Physical Exam  Constitutional:       General: She is not in acute distress.  HENT:      Head: Normocephalic and atraumatic.   Neurological:      Mental Status: She is alert.   Psychiatric:         Mood and Affect: Mood normal.         Behavior: Behavior normal.         *Physical exam limited by virtual visit.    Health Maintenance         Date Due Completion Date    COVID-19 Vaccine (4 - 2023-24 season) 09/01/2023 1/11/2022    TETANUS VACCINE 07/28/2024 7/28/2014

## 2024-02-26 ENCOUNTER — TELEPHONE (OUTPATIENT)
Dept: FAMILY MEDICINE | Facility: CLINIC | Age: 19
End: 2024-02-26
Payer: COMMERCIAL

## 2024-03-04 ENCOUNTER — TELEPHONE (OUTPATIENT)
Dept: FAMILY MEDICINE | Facility: CLINIC | Age: 19
End: 2024-03-04
Payer: COMMERCIAL

## 2024-03-06 ENCOUNTER — PATIENT MESSAGE (OUTPATIENT)
Dept: FAMILY MEDICINE | Facility: CLINIC | Age: 19
End: 2024-03-06
Payer: COMMERCIAL

## 2024-03-26 ENCOUNTER — TELEPHONE (OUTPATIENT)
Dept: FAMILY MEDICINE | Facility: CLINIC | Age: 19
End: 2024-03-26
Payer: COMMERCIAL

## 2024-04-05 ENCOUNTER — PATIENT MESSAGE (OUTPATIENT)
Dept: FAMILY MEDICINE | Facility: CLINIC | Age: 19
End: 2024-04-05
Payer: COMMERCIAL

## 2024-04-15 ENCOUNTER — PATIENT MESSAGE (OUTPATIENT)
Dept: PEDIATRICS | Facility: CLINIC | Age: 19
End: 2024-04-15
Payer: COMMERCIAL

## 2024-08-06 NOTE — TELEPHONE ENCOUNTER
RX Authorization    Medication: diazepam (VALIUM) 2 MG tablet     Date last refill ordered: 5/2/24    Quantity ordered: 540    # refills: 0    Date of last clinic visit with ordering provider: 12/6/23 ([facility])    Date of next clinic visit with ordering provider: None scheduled   Please inform patient that her labs were normal, except her platelets were a little elevated. Need to recheck CBC to make sure this has resolved - sent to [facility]. May need further workup to determine the cause if still elevated.

## 2024-11-12 ENCOUNTER — PATIENT MESSAGE (OUTPATIENT)
Dept: FAMILY MEDICINE | Facility: CLINIC | Age: 19
End: 2024-11-12
Payer: COMMERCIAL

## 2024-12-27 ENCOUNTER — TELEPHONE (OUTPATIENT)
Dept: OBSTETRICS AND GYNECOLOGY | Facility: CLINIC | Age: 19
End: 2024-12-27

## 2024-12-27 ENCOUNTER — OFFICE VISIT (OUTPATIENT)
Dept: OBSTETRICS AND GYNECOLOGY | Facility: CLINIC | Age: 19
End: 2024-12-27
Payer: COMMERCIAL

## 2024-12-27 VITALS
BODY MASS INDEX: 31.83 KG/M2 | DIASTOLIC BLOOD PRESSURE: 80 MMHG | SYSTOLIC BLOOD PRESSURE: 118 MMHG | WEIGHT: 185.44 LBS

## 2024-12-27 DIAGNOSIS — Z12.4 ENCOUNTER FOR PAPANICOLAOU SMEAR FOR CERVICAL CANCER SCREENING: ICD-10-CM

## 2024-12-27 DIAGNOSIS — Z30.40 ENCOUNTER FOR SURVEILLANCE OF CONTRACEPTIVES, UNSPECIFIED CONTRACEPTIVE: ICD-10-CM

## 2024-12-27 DIAGNOSIS — Z01.419 WELL WOMAN EXAM WITH ROUTINE GYNECOLOGICAL EXAM: Primary | ICD-10-CM

## 2024-12-27 DIAGNOSIS — Z11.3 SCREEN FOR STD (SEXUALLY TRANSMITTED DISEASE): ICD-10-CM

## 2024-12-27 LAB
B-HCG UR QL: NEGATIVE
CTP QC/QA: YES

## 2024-12-27 PROCEDURE — 99999 PR PBB SHADOW E&M-EST. PATIENT-LVL II: CPT | Mod: PBBFAC,,, | Performed by: OBSTETRICS & GYNECOLOGY

## 2024-12-27 RX ORDER — NORELGESTROMIN AND ETHINYL ESTRADIOL 35; 150 UG/MG; UG/MG
1 PATCH TRANSDERMAL WEEKLY
Qty: 3 PATCH | Refills: 11 | Status: SHIPPED | OUTPATIENT
Start: 2024-12-27

## 2024-12-27 NOTE — PROGRESS NOTES
SUBJECTIVE:   Stephany Magaña is a 19 y.o. female No obstetric history on file.  for annual well woman exam. No LMP recorded. Patient has had an injection..  She has no unusual complaints.      Contraception: on depo provera but is a few weeks late  Has not been sexually active since she past due for depo  Light spotting  However would like to switch to patches  She is allergic to a few adhesive tapes including bandages  However still elect to try contraceptive patches  + migraines but without aura      Past Medical History:   Diagnosis Date    Precocious puberty      Past Surgical History:   Procedure Laterality Date    KNEE SURGERY Left 11/2019    torn ACL     Social History     Socioeconomic History    Marital status: Single   Tobacco Use    Smoking status: Never    Smokeless tobacco: Never   Substance and Sexual Activity    Alcohol use: Never    Drug use: Never    Sexual activity: Never     Social Drivers of Health     Financial Resource Strain: Low Risk  (2/18/2024)    Overall Financial Resource Strain (CARDIA)     Difficulty of Paying Living Expenses: Not hard at all   Food Insecurity: No Food Insecurity (2/18/2024)    Hunger Vital Sign     Worried About Running Out of Food in the Last Year: Never true     Ran Out of Food in the Last Year: Never true   Transportation Needs: No Transportation Needs (2/18/2024)    PRAPARE - Transportation     Lack of Transportation (Medical): No     Lack of Transportation (Non-Medical): No   Physical Activity: Insufficiently Active (2/18/2024)    Exercise Vital Sign     Days of Exercise per Week: 4 days     Minutes of Exercise per Session: 20 min   Stress: No Stress Concern Present (2/18/2024)    [place] of [employer]ational Health - Occupational Stress Questionnaire     Feeling of Stress : Only a little   Housing Stability: Low Risk  (2/18/2024)    Housing Stability Vital Sign     Unable to Pay for Housing in the Last Year: No     Number of Places Lived in the Last Year: 2      Unstable Housing in the Last Year: No     No family history on file.  OB History    Para Term  AB Living   0 0 0 0 0 0   SAB IAB Ectopic Multiple Live Births   0 0 0 0 0   Obstetric Comments   Gynhx: reg   Ocp --> depo provera   Sexually active with men only,    Denies std         Current Outpatient Medications   Medication Sig Dispense Refill    hydrOXYzine HCL (ATARAX) 10 MG Tab Take 1 tablet (10 mg total) by mouth 3 (three) times daily as needed (hives). 90 tablet 1    propranoloL (INDERAL) 20 MG tablet Take 1 tablet (20 mg total) by mouth 2 (two) times daily. 180 tablet 3     No current facility-administered medications for this visit.     Allergies: No known drug allergies       ROS:  GENERAL: Denies weight gain or weight loss. Feeling well overall.   SKIN: Denies rash or lesions.   HEAD: Denies head injury or headache.   NODES: Denies enlarged lymph nodes.   CHEST: Denies chest pain or shortness of breath.   CARDIOVASCULAR: Denies palpitations or left sided chest pain.   ABDOMEN: No abdominal pain, constipation, diarrhea, nausea, vomiting or rectal bleeding.   URINARY: No frequency, dysuria, hematuria, or burning on urination.  REPRODUCTIVE: Denies vaginal discharge, abnormal vaginal bleeding, lesions, pelvic pain  BREASTS: The patient performs breast self-examination and denies pain, lumps, or nipple discharge.   HEMATOLOGIC: No easy bruisability or excessive bleeding.  MUSCULOSKELETAL: Denies joint pain or swelling.   NEUROLOGIC: Denies syncope or weakness.   PSYCHIATRIC: Denies depression, anxiety or mood swings.      OBJECTIVE:   /80   Wt 84.1 kg (185 lb 6.5 oz)   BMI 31.83 kg/m²   The patient appears well, alert, oriented x 3, in no distress.  PSYCH:  Normal, full range of affect  NECK: negative, no thyromegaly, trachea midline  SKIN: normal, good color, good turgor and no acne, striae, hirsutism  BREAST EXAM: breasts appear normal, no suspicious masses, no skin or nipple changes  "or axillary nodes  ABDOMEN: soft, non-tender; bowel sounds normal; no masses,  no organomegaly and no hernias, masses, or hepatosplenomegaly  GENITALIA: normal external genitalia, no erythema, no discharge  BLADDER: soft  URETHRA: normal appearing urethra with no masses, tenderness or lesions and normal urethra, normal urethral meatus  VAGINA: Normal  CERVIX: no lesions or cervical motion tenderness  UTERUS: normal size, contour, position, consistency, mobility, non-tender  ADNEXA: no mass, fullness, tenderness      ASSESSMENT:   Chris LEUNG "Stephany" was seen today for well woman.    Diagnoses and all orders for this visit:    Well woman exam with routine gynecological exam    Encounter for Papanicolaou smear for cervical cancer screening        1. Health maintenance  -pap done. Discussed ASCCP guideline screening every 3   -screening for std, serology to be done at [facility]  -counseled on exercise and healthy diet, weight loss  -bone health:  Discussed Vitamin D and Calcium supplementation, weight bearing exercises  2.  Discussed safety at home/school/work, healthy and balanced diet, sleep hygiene, as well as violence/weapons exposure.   3.  Contraception:  UPT negative, rx for xulane patches, use back up with condoms x 2-3 weeks  "

## 2024-12-27 NOTE — TELEPHONE ENCOUNTER
----- Message from [place] sent at 12/27/2024  2:31 PM CST -----  .Type: Patient Call Back    Who called: Self     What is the request in detail: Stated her lab orders were not received by [facility]. Stated she is at [facility] now. Ask that the nurse give her a call     Can the clinic reply by [facility]? No     Would the patient rather a call back or a response via [facility]? Call Back     Best call back number: [phone]        Additional Information:

## 2025-01-10 ENCOUNTER — OFFICE VISIT (OUTPATIENT)
Dept: FAMILY MEDICINE | Facility: CLINIC | Age: 20
End: 2025-01-10
Payer: COMMERCIAL

## 2025-01-10 VITALS
DIASTOLIC BLOOD PRESSURE: 75 MMHG | HEIGHT: 64 IN | WEIGHT: 188.13 LBS | BODY MASS INDEX: 32.12 KG/M2 | SYSTOLIC BLOOD PRESSURE: 119 MMHG | HEART RATE: 85 BPM | TEMPERATURE: 98 F | OXYGEN SATURATION: 100 % | RESPIRATION RATE: 18 BRPM

## 2025-01-10 DIAGNOSIS — Z00.00 ANNUAL PHYSICAL EXAM: ICD-10-CM

## 2025-01-10 DIAGNOSIS — Z23 IMMUNIZATION DUE: Primary | ICD-10-CM

## 2025-01-10 DIAGNOSIS — R53.82 CHRONIC FATIGUE: ICD-10-CM

## 2025-01-10 DIAGNOSIS — D75.839 THROMBOCYTOSIS: ICD-10-CM

## 2025-01-10 PROCEDURE — 99999 PR PBB SHADOW E&M-EST. PATIENT-LVL IV: CPT | Mod: PBBFAC,,, | Performed by: STUDENT IN AN ORGANIZED HEALTH CARE EDUCATION/TRAINING PROGRAM

## 2025-01-10 PROCEDURE — 99395 PREV VISIT EST AGE 18-39: CPT | Mod: 25,S$GLB,, | Performed by: STUDENT IN AN ORGANIZED HEALTH CARE EDUCATION/TRAINING PROGRAM

## 2025-01-10 PROCEDURE — 90471 IMMUNIZATION ADMIN: CPT | Mod: S$GLB,,, | Performed by: STUDENT IN AN ORGANIZED HEALTH CARE EDUCATION/TRAINING PROGRAM

## 2025-01-10 PROCEDURE — 90656 IIV3 VACC NO PRSV 0.5 ML IM: CPT | Mod: S$GLB,,, | Performed by: STUDENT IN AN ORGANIZED HEALTH CARE EDUCATION/TRAINING PROGRAM

## 2025-01-10 NOTE — PROGRESS NOTES
Assessment/Plan:    ANNUAL EXAM   patient here for annual exam.    - Labs ordered. Health maintenance was reviewed and ordered. Medications were reviewed and reconciled.  - All questions were answered. Advised of Wellness plan.   - Follow up in 1 year for ANNUAL WELLNESS EXAM      Problem List Items Addressed This Visit          Oncology    Thrombocytosis    Overview     Lab Results   Component Value Date    WBC 7.1 09/29/2023    HGB 12.1 09/29/2023    HCT 38.4 09/29/2023    MCV 86.5 09/29/2023     (H) 09/29/2023   - repeat labs     path review 2023  PATHOLOGIST INTERPRETATION   RBC- No significant morphologic abnormalities, normal indices   WBC- Normal morphology and differential.  No dysplasia or circulating   blasts.   PLT- Increased in number. No clumping.               Other    Annual physical exam    Overview     Complete history and physical was completed today.    Complete and thorough medication reconciliation was performed. Discussed risks and benefits of medications.    Advised patient on orders and health maintenance.    Continue current medications listed on your summary sheet.    All questions were answered.   Follow up as planned or prn            Relevant Orders    Lipid Panel    TSH    CBC Without Differential    Comprehensive Metabolic Panel    Hemoglobin A1C    Chronic fatigue    Relevant Orders    Lipid Panel    TSH    CBC Without Differential    Comprehensive Metabolic Panel    Hemoglobin A1C    Iron and TIBC    Ferritin     Other Visit Diagnoses       Immunization due    -  Primary    Relevant Medications    influenza (Flulaval, Fluzone, Fluarix) 45 mcg/0.5 mL IM vaccine (> or = 6 mo) 0.5 mL (Completed)                Assessment & Plan    INFLUENZA VACCINATION:  - Administered flu vaccine during the visit.    FATIGUE:  - Performed a general exam and noted fatigue as an abnormal finding.  - Assessed for potential causes of fatigue, including depression, diet, stress, and thyroid  issues.  - Ordered labs, including iron levels and thyroid tests.  - Recommend increasing exercise to improve energy levels.  - Patient reported minimal headaches and constant fatigue despite adequate sleep.  - Assessed reported fatigue despite adequate sleep and naps.  - Evaluated potential causes for fatigue, including depression, diet, stress, and thyroid function.  - Ordered labs to check iron levels and thyroid function.  - Assessed for other symptoms such as headaches, chest pain, and breathing difficulties.  - Recommend increasing exercise to improve energy levels.  - Noted that the fatigue is not associated with depression or stress.  - Considered exercise as a potential intervention for fatigue and mood improvement.  - Explained potential benefits of exercise for energy levels and mood enhancement.  - Recommend increasing physical activity to improve energy levels and mood.  - Patient reported not exercising as much as they should.  - Patient to increase exercise frequency to help with energy levels and mood.    HEADACHES:  - Reviewed current medication regimen, noting decreased use of propranolol for headaches.  - Discontinued propranolol for headaches.    BIRTH CONTROL:  - Continued birth control patch.  - Evaluated the effectiveness of the birth control patch.    GASTROINTESTINAL DISCOMFORT:  - Assessed potential causes of reported occasional stomach tightness, including hydration and fiber intake.    NUTRITION:  - Discussed the importance of a well-balanced diet with fruits and vegetables for proper nutrition.  - Inquired about the patient's current diet.  - Patient to maintain a well-balanced diet including fruits and vegetables.           Kamryn Arce MD  _________________________________________________________________________      Patient ID: Stephany Magaña is a 19 y.o. female.    Chief Complaint:  Encounter for annual exam    HPI: Patient here for a comprehensive physical exam.    FATIGUE:  She  reports feeling constantly tired despite adequate sleep, requiring daytime naps that sometimes last up to three hours.    GASTROINTESTINAL:  She reports occasional abdominal tightness.    HEADACHES:  She reports minimal headaches with decreased need for headache medication.    CONTRACEPTION:  She recently switched to the contraceptive patch.    SOCIAL HISTORY:  She is currently a student studying kinesiology.                 Health Maintenance Topics with due status: Not Due       Topic Last Completion Date    Chlamydia Screening 12/27/2024    Pap Smear 12/27/2024    RSV Vaccine (Age 60+ and Pregnant patients) Not Due        ==============================================  History reviewed.   Health Maintenance Due   Topic Date Due    TETANUS VACCINE  07/28/2024    COVID-19 Vaccine (4 - 2024-25 season) 09/01/2024       Past Medical History:  Past Medical History:   Diagnosis Date    Precocious puberty      Past Surgical History:   Procedure Laterality Date    KNEE SURGERY Left 11/2019    torn ACL     Review of patient's allergies indicates:   Allergen Reactions    No known drug allergies      Current Outpatient Medications on File Prior to Visit   Medication Sig Dispense Refill    norelgestromin-ethinyl estradiol 150-35 mcg/24 hr Place 1 patch onto the skin once a week. 3 patch 11     No current facility-administered medications on file prior to visit.     Social History     Socioeconomic History    Marital status: Single   Tobacco Use    Smoking status: Never    Smokeless tobacco: Never   Substance and Sexual Activity    Alcohol use: Never    Drug use: Never    Sexual activity: Never     Social Drivers of Health     Financial Resource Strain: Low Risk  (2/18/2024)    Overall Financial Resource Strain (CARDIA)     Difficulty of Paying Living Expenses: Not hard at all   Food Insecurity: No Food Insecurity (2/18/2024)    Hunger Vital Sign     Worried About Running Out of Food in the Last Year: Never true     Ran Out of  Food in the Last Year: Never true   Transportation Needs: No Transportation Needs (2/18/2024)    PRAPARE - Transportation     Lack of Transportation (Medical): No     Lack of Transportation (Non-Medical): No   Physical Activity: Insufficiently Active (2/18/2024)    Exercise Vital Sign     Days of Exercise per Week: 4 days     Minutes of Exercise per Session: 20 min   Stress: No Stress Concern Present (2/18/2024)    [place] of [employer]ational Health - Occupational Stress Questionnaire     Feeling of Stress : Only a little   Housing Stability: Low Risk  (2/18/2024)    Housing Stability Vital Sign     Unable to Pay for Housing in the Last Year: No     Number of Places Lived in the Last Year: 2     Unstable Housing in the Last Year: No     No family history on file.            Objective:    Nursing note and vitals reviewed.  Vitals:    01/10/25 0931   BP: 119/75   Pulse: 85   Resp: 18   Temp: 97.8 °F (36.6 °C)     Body mass index is 32.29 kg/m².     Physical Exam   Constitutional: oriented to person, place, and time. well-developed and well-nourished. No distress.   HENT: WNL  Head: Normocephalic and atraumatic.   Eyes: Pupils are equal, round, and reactive to light. EOM are normal.   Neck: Normal range of motion. Neck supple.   Cardiovascular: Normal rate, regular rhythm, normal heart sounds and intact distal pulses. No murmur heard.  Pulmonary/Chest: Effort normal and breath sounds normal. No respiratory distress. no wheezes.   GI: soft, no ttp, non-distended   Musculoskeletal: Normal range of motion. no edema.   Neurological: CN II-XII intact  Skin: warm and dry. Capillary refill takes less than 2 seconds.   Psychiatric: normal mood and affect. behavior is normal.   Physical Exam                    We Offer Telehealth & Same Day Appointments!   Book your Telehealth appointment with me through my nurse or   Clinic appointments on [employer]!  Bssaho-[phone]     To Schedule appointments online, go to [email or web address]:  [email or web address]     This note was generated with the assistance of ambient listening technology. Verbal consent was obtained by the patient and accompanying visitor(s) for the recording of patient appointment to facilitate this note. I attest to having reviewed and edited the generated note for accuracy, though some syntax or spelling errors may persist. Please contact the author of this note for any clarification.

## 2025-01-13 NOTE — TELEPHONE ENCOUNTER
Refill Routing Note   Medication(s) are not appropriate for processing by [facility] for the following reason(s):        New or recently adjusted medication  Alternate pharmacy req from previous order ( [facility])    [facility] action(s):  Defer        Medication Therapy Plan: Patient has requested an alternate pharmacy. [facility], Pt now req [facility]      Appointments  past 12m or future 3m with PCP    Date Provider   Last Visit   12/27/2024 Rashida Hardy MD   Next Visit   Visit date not found Rashida Hardy MD   ED visits in past 90 days: 0        Note composed:2:40 PM 01/13/2025

## 2025-01-14 ENCOUNTER — TELEPHONE (OUTPATIENT)
Dept: FAMILY MEDICINE | Facility: CLINIC | Age: 20
End: 2025-01-14
Payer: COMMERCIAL

## 2025-01-14 RX ORDER — NORELGESTROMIN AND ETHINYL ESTRADIOL 35; 150 UG/MG; UG/MG
1 PATCH TRANSDERMAL WEEKLY
Qty: 3 PATCH | Refills: 11 | Status: SHIPPED | OUTPATIENT
Start: 2025-01-14

## 2025-01-14 NOTE — TELEPHONE ENCOUNTER
----- Message from Gloria sent at 1/14/2025 12:44 PM CST -----  Contact: [phone]  .[email or web address]     Patient is calling for Medical Advice regarding:pt is at [facility]     How long has patient had these symptoms:states labs have not been sent     Pharmacy name and phone#:    Patient wants a call back or thru [facility]:call back     Comments:  Please advise   Please advise patient replies from provider may take up to 48 hours.

## 2025-01-28 ENCOUNTER — TELEPHONE (OUTPATIENT)
Dept: FAMILY MEDICINE | Facility: CLINIC | Age: 20
End: 2025-01-28
Payer: COMMERCIAL

## 2025-01-28 NOTE — TELEPHONE ENCOUNTER
----- Message from Tyrone sent at 1/28/2025 12:29 PM CST -----  Contact: Stephany  Type:  Patient Requesting a call back     Who Called:Stephany  What is the call back request regarding?:Requesting that her lab orders be sent to [facility]. Pt stated they were supposed to be sent a couple weeks ago, but [facility] still has not received them  Would the patient rather a call back or a response via [facility]?call  Best Call Back Number:[phone]   Additional Information:

## 2025-02-06 RX ORDER — NORELGESTROMIN AND ETHINYL ESTRADIOL 35; 150 UG/MG; UG/MG
1 PATCH TRANSDERMAL WEEKLY
Qty: 3 PATCH | Refills: 11 | OUTPATIENT
Start: 2025-02-06

## 2025-02-06 NOTE — TELEPHONE ENCOUNTER
Refill Decision Note   Stephany MADHU Magaña  is requesting a refill authorization.  Brief Assessment and Rationale for Refill:  Quick Discontinue     Medication Therapy Plan:  Receipt confirmed by pharmacy (1/14/2025  8:59 AM CST). Pt has refills      Comments:     Note composed:2:00 PM 02/06/2025

## 2025-03-17 ENCOUNTER — TELEPHONE (OUTPATIENT)
Dept: PRIMARY CARE CLINIC | Facility: CLINIC | Age: 20
End: 2025-03-17
Payer: COMMERCIAL

## 2025-03-17 NOTE — TELEPHONE ENCOUNTER
----- Message from Lyndsey sent at 3/17/2025 11:31 AM CDT -----  Contact: pt  Type: Orders RequestWhat orders/ testing are being requested? Lab orders were supposed to be sent to [facility] in [place].Is there a future appointment scheduled for the patient with PCP? Dania? Already had appt on 1/10/2025 Would you prefer a response via [employer]? My chart once lab are sentComments: please send orders to [facility] in [place] and also noted that is for an annual on the orders

## 2025-03-17 NOTE — TELEPHONE ENCOUNTER
I have attempted to contact this patient by phone with the following results: no answer, left message to return my call on answering machine.      I have sent orders sent to [facility].